# Patient Record
Sex: FEMALE | Race: WHITE | URBAN - NONMETROPOLITAN AREA
[De-identification: names, ages, dates, MRNs, and addresses within clinical notes are randomized per-mention and may not be internally consistent; named-entity substitution may affect disease eponyms.]

---

## 2021-11-29 ENCOUNTER — OFFICE VISIT (OUTPATIENT)
Dept: URBAN - NONMETROPOLITAN AREA CLINIC 13 | Facility: CLINIC | Age: 66
End: 2021-11-29
Payer: MEDICARE

## 2021-11-29 ENCOUNTER — WEB ENCOUNTER (OUTPATIENT)
Dept: URBAN - NONMETROPOLITAN AREA CLINIC 13 | Facility: CLINIC | Age: 66
End: 2021-11-29

## 2021-11-29 DIAGNOSIS — D50.8 OTHER IRON DEFICIENCY ANEMIA: ICD-10-CM

## 2021-11-29 DIAGNOSIS — R19.4 CHANGE IN BOWEL HABIT: ICD-10-CM

## 2021-11-29 DIAGNOSIS — Z12.11 COLON CANCER SCREENING: ICD-10-CM

## 2021-11-29 DIAGNOSIS — R74.8 ELEVATED LIVER ENZYMES: ICD-10-CM

## 2021-11-29 PROCEDURE — 99204 OFFICE O/P NEW MOD 45 MIN: CPT | Performed by: INTERNAL MEDICINE

## 2021-11-29 PROCEDURE — 99244 OFF/OP CNSLTJ NEW/EST MOD 40: CPT | Performed by: INTERNAL MEDICINE

## 2021-11-29 RX ORDER — CYCLOBENZAPRINE HYDROCHLORIDE 10 MG/1
TAKE ONE TABLET BY MOUTH ONE TIME DAILY TABLET, FILM COATED ORAL
Qty: 28 | Refills: 0 | Status: ACTIVE | COMMUNITY

## 2021-11-29 RX ORDER — LIDOCAINE 700 MG/1
APPLY TWO PATCHES TOPICALLY ONE TIME DAILY, MAY WEAR UP TO 12 HOURS PATCH TOPICAL
Qty: 60 | Refills: 0 | Status: ACTIVE | COMMUNITY

## 2021-11-29 RX ORDER — SULFAMETHOXAZOLE AND TRIMETHOPRIM 800; 160 MG/1; MG/1
TAKE ONE TABLET BY MOUTH TWICE A DAY TABLET ORAL
Qty: 60 | Refills: 0 | Status: ACTIVE | COMMUNITY

## 2021-11-29 RX ORDER — MIRABEGRON 50 MG/1
TAKE ONE TABLET BY MOUTH ONE TIME DAILY TABLET, FILM COATED, EXTENDED RELEASE ORAL
Qty: 90 | Refills: 1 | Status: ACTIVE | COMMUNITY

## 2021-11-29 RX ORDER — OXYCODONE HYDROCHLORIDE 10 MG/1
TAKE ONE TABLET BY MOUTH FIVE TIMES A DAY FOR 28 DAYS TABLET ORAL
Qty: 140 | Refills: 0 | Status: ACTIVE | COMMUNITY

## 2021-11-29 RX ORDER — MORPHINE SULFATE 30 MG/1
TAKE ONE TABLET BY MOUTH TWICE A DAY TABLET, FILM COATED, EXTENDED RELEASE ORAL
Qty: 56 | Refills: 0 | Status: ACTIVE | COMMUNITY

## 2021-11-29 NOTE — HPI-TODAY'S VISIT:
Ms. Nieves she has had numerous surgeries on her back dating back to 2019. She is undergoing 3 of which this year. Is a 66-year-old female who we have been asked to consult on by Dr. Clark for anemia and elevated LFTs as well as CT scan with constipation. A copy of this note along with recommendations will be sent to referring provider. Unfortunately, this year some of her hardware became infected following the distal procedure. She was admitted early in the fall with sepsis. At this time she is noted to have elevated LFTs with alk phos in the 400s. This has trended down. She is on daily antibiotics and is followed by infectious disease. She has been anemic dating back to 2020. Her hemoglobin tends to be between 9.3 and 10. She does recall having to get blood transfused. Her last colonoscopy was in 2017 with Dr. Tubbs and she reports this was normal. She denies any blood in her stool. She did have a recent abdomen and pelvis CT scan during her sepsis event. It did show normal liver, did show evidence of constipation. Patient reports that since being home, she is currently moving her bowels once daily. She is using MiraLAX and fruit smoothies and taking a small dose of fiber at bedtime. She is still on opioids related to back pain. Sb

## 2021-12-02 ENCOUNTER — TELEPHONE ENCOUNTER (OUTPATIENT)
Dept: URBAN - METROPOLITAN AREA CLINIC 92 | Facility: CLINIC | Age: 66
End: 2021-12-02

## 2021-12-02 LAB
A/G RATIO: 1.3
ACTIN (SMOOTH MUSCLE) ANTIBODY: 8
AFP, SERUM, TUMOR MARKER: 1.6
ALBUMIN: 4.4
ALKALINE PHOSPHATASE: 218
ALPHA 2-MACROGLOBULINS, QN: 211
ALPHA-1-ANTITRYPSIN, SERUM: 164
ALT (SGPT) P5P: 111
ALT (SGPT): 88
ANA DIRECT: NEGATIVE
APOLIPOPROTEIN A-1: 153
AST (SGOT) P5P: 57
AST (SGOT): 50
BILIRUBIN, TOTAL: 0.2
BILIRUBIN, TOTAL: 0.2
BUN/CREATININE RATIO: 19
BUN: 15
CALCIUM: 9.8
CARBON DIOXIDE, TOTAL: 22
CERULOPLASMIN: 38.8
CHLORIDE: 101
CHOLESTEROL, TOTAL: 166
COMMENT:: (no result)
CREATININE: 0.77
DEAMIDATED GLIADIN ABS, IGA: 9
DEAMIDATED GLIADIN ABS, IGG: 18
EGFR IF AFRICN AM: 93
EGFR IF NONAFRICN AM: 81
ENDOMYSIAL ANTIBODY IGA: NEGATIVE
FIBROSIS SCORE: 0.17
FIBROSIS SCORING:: (no result)
FIBROSIS STAGE: (no result)
GGT: 105
GGT: 109
GLOBULIN, TOTAL: 3.5
GLUCOSE, SERUM: 103
GLUCOSE: 94
HAPTOGLOBIN: 264
HBSAG SCREEN: NEGATIVE
HCV AB: 0.4
HEIGHT:: 65
HEP A AB, IGM: NEGATIVE
HEP B CORE AB, IGM: NEGATIVE
IMMUNOGLOBULIN A, QN, SERUM: 466
INR: (no result)
INTERPRETATION:: (no result)
INTERPRETATIONS:: (no result)
LIMITATIONS:: (no result)
LIVER-KIDNEY MICROSOMAL AB: 4.8
MITOCHONDRIAL (M2) ANTIBODY: <20
NASH GRADE: (no result)
NASH SCORE: 0.5
NASH SCORING: (no result)
POTASSIUM: 4.5
PROTEIN, TOTAL: 7.9
PROTHROMBIN TIME: (no result)
REQUEST PROBLEM: (no result)
REQUEST PROBLEM: (no result)
SODIUM: 142
STEATOSIS GRADE: (no result)
STEATOSIS GRADING: (no result)
STEATOSIS SCORE: 0.85
T-TRANSGLUTAMINASE (TTG) IGA: <2
T-TRANSGLUTAMINASE (TTG) IGG: 13
TRIGLYCERIDES: 130
WEIGHT:: 205

## 2021-12-02 RX ORDER — SODIUM PICOSULFATE, MAGNESIUM OXIDE, AND ANHYDROUS CITRIC ACID 10; 3.5; 12 MG/160ML; G/160ML; G/160ML
160 ML LIQUID ORAL
Qty: 320 MILLILITER | Refills: 0 | OUTPATIENT
Start: 2021-12-02 | End: 2021-12-03

## 2022-01-17 PROBLEM — 87522002 IRON DEFICIENCY ANEMIA: Status: ACTIVE | Noted: 2021-11-29

## 2022-01-20 ENCOUNTER — OFFICE VISIT (OUTPATIENT)
Dept: URBAN - METROPOLITAN AREA TELEHEALTH 2 | Facility: TELEHEALTH | Age: 67
End: 2022-01-20

## 2022-01-24 ENCOUNTER — TELEPHONE ENCOUNTER (OUTPATIENT)
Dept: URBAN - METROPOLITAN AREA CLINIC 82 | Facility: CLINIC | Age: 67
End: 2022-01-24

## 2022-02-01 ENCOUNTER — TELEPHONE ENCOUNTER (OUTPATIENT)
Dept: URBAN - METROPOLITAN AREA CLINIC 92 | Facility: CLINIC | Age: 67
End: 2022-02-01

## 2022-02-01 ENCOUNTER — CLAIMS CREATED FROM THE CLAIM WINDOW (OUTPATIENT)
Dept: URBAN - METROPOLITAN AREA CLINIC 4 | Facility: CLINIC | Age: 67
End: 2022-02-01
Payer: MEDICARE

## 2022-02-01 ENCOUNTER — OFFICE VISIT (OUTPATIENT)
Dept: URBAN - NONMETROPOLITAN AREA SURGERY CENTER 1 | Facility: SURGERY CENTER | Age: 67
End: 2022-02-01
Payer: MEDICARE

## 2022-02-01 DIAGNOSIS — K31.89 DUODENAL ERYTHEMA: ICD-10-CM

## 2022-02-01 DIAGNOSIS — D50.9 ANEMIA: ICD-10-CM

## 2022-02-01 PROBLEM — 8493009: Status: ACTIVE | Noted: 2022-02-01

## 2022-02-01 PROCEDURE — 45378 DIAGNOSTIC COLONOSCOPY: CPT | Performed by: INTERNAL MEDICINE

## 2022-02-01 PROCEDURE — 88312 SPECIAL STAINS GROUP 1: CPT | Performed by: PATHOLOGY

## 2022-02-01 PROCEDURE — 88305 TISSUE EXAM BY PATHOLOGIST: CPT | Performed by: PATHOLOGY

## 2022-02-01 PROCEDURE — 43239 EGD BIOPSY SINGLE/MULTIPLE: CPT | Performed by: INTERNAL MEDICINE

## 2022-02-01 PROCEDURE — G8907 PT DOC NO EVENTS ON DISCHARG: HCPCS | Performed by: INTERNAL MEDICINE

## 2022-02-01 RX ORDER — OMEPRAZOLE 40 MG/1
1 CAPSULE CAPSULE, DELAYED RELEASE ORAL
Qty: 120 | Refills: 1 | OUTPATIENT
Start: 2022-02-01

## 2022-02-01 RX ORDER — MORPHINE SULFATE 30 MG/1
TAKE ONE TABLET BY MOUTH TWICE A DAY TABLET, FILM COATED, EXTENDED RELEASE ORAL
Qty: 56 | Refills: 0 | Status: ACTIVE | COMMUNITY

## 2022-02-01 RX ORDER — SULFAMETHOXAZOLE AND TRIMETHOPRIM 800; 160 MG/1; MG/1
TAKE ONE TABLET BY MOUTH TWICE A DAY TABLET ORAL
Qty: 60 | Refills: 0 | Status: ACTIVE | COMMUNITY

## 2022-02-01 RX ORDER — LIDOCAINE 700 MG/1
APPLY TWO PATCHES TOPICALLY ONE TIME DAILY, MAY WEAR UP TO 12 HOURS PATCH TOPICAL
Qty: 60 | Refills: 0 | Status: ACTIVE | COMMUNITY

## 2022-02-01 RX ORDER — OXYCODONE HYDROCHLORIDE 10 MG/1
TAKE ONE TABLET BY MOUTH FIVE TIMES A DAY FOR 28 DAYS TABLET ORAL
Qty: 140 | Refills: 0 | Status: ACTIVE | COMMUNITY

## 2022-02-01 RX ORDER — CYCLOBENZAPRINE HYDROCHLORIDE 10 MG/1
TAKE ONE TABLET BY MOUTH ONE TIME DAILY TABLET, FILM COATED ORAL
Qty: 28 | Refills: 0 | Status: ACTIVE | COMMUNITY

## 2022-02-01 RX ORDER — MIRABEGRON 50 MG/1
TAKE ONE TABLET BY MOUTH ONE TIME DAILY TABLET, FILM COATED, EXTENDED RELEASE ORAL
Qty: 90 | Refills: 1 | Status: ACTIVE | COMMUNITY

## 2022-02-14 ENCOUNTER — DASHBOARD ENCOUNTERS (OUTPATIENT)
Age: 67
End: 2022-02-14

## 2022-02-14 ENCOUNTER — OFFICE VISIT (OUTPATIENT)
Dept: URBAN - NONMETROPOLITAN AREA CLINIC 13 | Facility: CLINIC | Age: 67
End: 2022-02-14
Payer: MEDICARE

## 2022-02-14 VITALS
SYSTOLIC BLOOD PRESSURE: 153 MMHG | WEIGHT: 220 LBS | HEIGHT: 65 IN | DIASTOLIC BLOOD PRESSURE: 79 MMHG | HEART RATE: 99 BPM | BODY MASS INDEX: 36.65 KG/M2

## 2022-02-14 DIAGNOSIS — R76.8 POSITIVE AUTOANTIBODY SCREENING FOR CELIAC DISEASE: ICD-10-CM

## 2022-02-14 DIAGNOSIS — R79.89 ABNORMAL LIVER FUNCTION TESTS: ICD-10-CM

## 2022-02-14 DIAGNOSIS — K76.0 NAFLD (NONALCOHOLIC FATTY LIVER DISEASE): ICD-10-CM

## 2022-02-14 PROCEDURE — 99214 OFFICE O/P EST MOD 30 MIN: CPT | Performed by: INTERNAL MEDICINE

## 2022-02-14 RX ORDER — CYCLOBENZAPRINE HYDROCHLORIDE 10 MG/1
TAKE ONE TABLET BY MOUTH ONE TIME DAILY TABLET, FILM COATED ORAL
Qty: 28 | Refills: 0 | Status: ACTIVE | COMMUNITY

## 2022-02-14 RX ORDER — LIDOCAINE 700 MG/1
APPLY TWO PATCHES TOPICALLY ONE TIME DAILY, MAY WEAR UP TO 12 HOURS PATCH TOPICAL
Qty: 60 | Refills: 0 | Status: ACTIVE | COMMUNITY

## 2022-02-14 RX ORDER — OMEPRAZOLE 40 MG/1
1 CAPSULE CAPSULE, DELAYED RELEASE ORAL
Qty: 120 | Refills: 1 | Status: ACTIVE | COMMUNITY
Start: 2022-02-01

## 2022-02-14 RX ORDER — SULFAMETHOXAZOLE AND TRIMETHOPRIM 800; 160 MG/1; MG/1
TAKE ONE TABLET BY MOUTH TWICE A DAY TABLET ORAL
Qty: 60 | Refills: 0 | Status: ACTIVE | COMMUNITY

## 2022-02-14 RX ORDER — OXYCODONE HYDROCHLORIDE 10 MG/1
TAKE ONE TABLET BY MOUTH FIVE TIMES A DAY FOR 28 DAYS TABLET ORAL
Qty: 140 | Refills: 0 | Status: ACTIVE | COMMUNITY

## 2022-02-14 RX ORDER — MIRABEGRON 50 MG/1
TAKE ONE TABLET BY MOUTH ONE TIME DAILY TABLET, FILM COATED, EXTENDED RELEASE ORAL
Qty: 90 | Refills: 1 | Status: ACTIVE | COMMUNITY

## 2022-02-14 RX ORDER — MORPHINE SULFATE 30 MG/1
TAKE ONE TABLET BY MOUTH TWICE A DAY TABLET, FILM COATED, EXTENDED RELEASE ORAL
Qty: 56 | Refills: 0 | Status: ACTIVE | COMMUNITY

## 2022-02-14 NOTE — HPI-TODAY'S VISIT:
2/14/22: Ms. Nieves returns for follow-up of iron deficiency anemia, elevated LFTs, and chronic constipation.  Since her last clinic visit, she had chronic serologies drawn.  This showed positive celiac testing.  She also had a FibroSure profile drawn which showed likely hepatic steatosis.  She underwent EGD and colonoscopy.  EGD with extensive duodenal biopsies did not show any evidence of Celiac disease.  Erythema was noted in the gastric body but biopsies were normal.  The entire colonoscopy appeared normal.  Today she reports that she is doing ok.  She does take a daily Bactrim and daily acetaminophen.  She is very cautious about her intake of acetaminophen.  We discussed fatty liver.  She takes vitamin E each day.  11/19/21: Ms. Nieves is a 66-year-old female who we have been asked to consult on by Dr. Clark for anemia and elevated LFTs as well as CT scan with constipation. A copy of this note along with recommendations will be sent to referring provider. Unfortunately, this year some of her hardware became infected following the distal procedure. She was admitted early in the fall with sepsis. At this time she is noted to have elevated LFTs with alk phos in the 400s. This has trended down. She is on daily antibiotics and is followed by infectious disease. She has been anemic dating back to 2020. Her hemoglobin tends to be between 9.3 and 10. She does recall having to get blood transfused. Her last colonoscopy was in 2017 with Dr. Tubbs and she reports this was normal. She denies any blood in her stool. She did have a recent abdomen and pelvis CT scan during her sepsis event. It did show normal liver, did show evidence of constipation. Patient reports that since being home, she is currently moving her bowels once daily. She is using MiraLAX and fruit smoothies and taking a small dose of fiber at bedtime. She is still on opioids related to back pain. Sb

## 2022-03-08 ENCOUNTER — TELEPHONE ENCOUNTER (OUTPATIENT)
Dept: URBAN - NONMETROPOLITAN AREA CLINIC 2 | Facility: CLINIC | Age: 67
End: 2022-03-08

## 2022-08-17 ENCOUNTER — OFFICE VISIT (OUTPATIENT)
Dept: URBAN - NONMETROPOLITAN AREA CLINIC 2 | Facility: CLINIC | Age: 67
End: 2022-08-17

## 2022-08-18 ENCOUNTER — OFFICE VISIT (OUTPATIENT)
Dept: URBAN - NONMETROPOLITAN AREA CLINIC 2 | Facility: CLINIC | Age: 67
End: 2022-08-18